# Patient Record
Sex: FEMALE | Race: WHITE | Employment: PART TIME | URBAN - METROPOLITAN AREA
[De-identification: names, ages, dates, MRNs, and addresses within clinical notes are randomized per-mention and may not be internally consistent; named-entity substitution may affect disease eponyms.]

---

## 2018-05-29 ENCOUNTER — LAB ENCOUNTER (OUTPATIENT)
Dept: LAB | Facility: HOSPITAL | Age: 58
End: 2018-05-29
Attending: INTERNAL MEDICINE
Payer: COMMERCIAL

## 2018-05-29 ENCOUNTER — OFFICE VISIT (OUTPATIENT)
Dept: INTERNAL MEDICINE CLINIC | Facility: CLINIC | Age: 58
End: 2018-05-29

## 2018-05-29 VITALS
TEMPERATURE: 98 F | HEART RATE: 85 BPM | SYSTOLIC BLOOD PRESSURE: 170 MMHG | BODY MASS INDEX: 19.9 KG/M2 | DIASTOLIC BLOOD PRESSURE: 90 MMHG | WEIGHT: 123.81 LBS | HEIGHT: 66 IN

## 2018-05-29 DIAGNOSIS — L65.9 HAIR LOSS: ICD-10-CM

## 2018-05-29 DIAGNOSIS — Z12.11 SCREENING FOR COLON CANCER: ICD-10-CM

## 2018-05-29 DIAGNOSIS — Z12.4 SCREENING FOR CERVICAL CANCER: ICD-10-CM

## 2018-05-29 DIAGNOSIS — R20.0 NUMBNESS OR TINGLING: ICD-10-CM

## 2018-05-29 DIAGNOSIS — Z00.00 ANNUAL PHYSICAL EXAM: Primary | ICD-10-CM

## 2018-05-29 DIAGNOSIS — I10 ESSENTIAL HYPERTENSION: ICD-10-CM

## 2018-05-29 DIAGNOSIS — Z00.00 ANNUAL PHYSICAL EXAM: ICD-10-CM

## 2018-05-29 DIAGNOSIS — Z12.39 SCREENING FOR BREAST CANCER: ICD-10-CM

## 2018-05-29 DIAGNOSIS — R20.2 NUMBNESS OR TINGLING: ICD-10-CM

## 2018-05-29 PROBLEM — Z80.3 FAMILY HISTORY OF BREAST CANCER IN SISTER: Status: ACTIVE | Noted: 2018-05-29

## 2018-05-29 PROBLEM — Z86.39 HISTORY OF GRAVES' DISEASE: Status: ACTIVE | Noted: 2018-05-29

## 2018-05-29 PROCEDURE — 84443 ASSAY THYROID STIM HORMONE: CPT

## 2018-05-29 PROCEDURE — 36415 COLL VENOUS BLD VENIPUNCTURE: CPT

## 2018-05-29 PROCEDURE — 80061 LIPID PANEL: CPT

## 2018-05-29 PROCEDURE — 85025 COMPLETE CBC W/AUTO DIFF WBC: CPT

## 2018-05-29 PROCEDURE — 99386 PREV VISIT NEW AGE 40-64: CPT | Performed by: INTERNAL MEDICINE

## 2018-05-29 PROCEDURE — 82607 VITAMIN B-12: CPT

## 2018-05-29 PROCEDURE — 81001 URINALYSIS AUTO W/SCOPE: CPT

## 2018-05-29 PROCEDURE — 80053 COMPREHEN METABOLIC PANEL: CPT

## 2018-05-29 PROCEDURE — 82746 ASSAY OF FOLIC ACID SERUM: CPT

## 2018-05-29 PROCEDURE — 83036 HEMOGLOBIN GLYCOSYLATED A1C: CPT

## 2018-05-29 RX ORDER — LOSARTAN POTASSIUM 50 MG/1
50 TABLET ORAL DAILY
Qty: 30 TABLET | Refills: 2 | Status: SHIPPED | OUTPATIENT
Start: 2018-05-29

## 2018-05-29 NOTE — H&P
Kalyani Younger is a 59year old female.   Patient presents with:  Blood Pressure: patient states she went in for a dentist appt and they told her that her bp was high  Hair/Scalp Problem: hair loss      HPI:     HPI  Patient is a 25-year-old female here a vision. Early cataracts b/l   Respiratory: Negative for cough, sputum production and shortness of breath. Cardiovascular: Negative for chest pain and leg swelling.    Gastrointestinal: Negative for blood in stool, constipation, diarrhea, heartburn normal without masses or tenderness  Perineum: normal  Anus: no hemorrhoids,   Bimanual pelvic exam and PAP smear obtained      ASSESSMENT AND PLAN:       Diagnoses and all orders for this visit:    Screening for colon cancer  -     GASTRO - INTERNAL    Sc daily.            The patient indicates understanding of these issues and agrees to the plan.               Duncan Garcia MD  5/29/2018  1:36 PM

## 2018-05-29 NOTE — ASSESSMENT & PLAN NOTE
Starting losartan 50 mg. Advised low salt diet. Eat less of canned , frozen, dried, packaged and fast food. Limit caffeine, alcohol. No smoking. Exercise regularly, at least 20 minutes 3 times a week. Maintain healthy body weight.    Check BP regularly

## 2018-05-30 PROBLEM — E53.8 VITAMIN B12 DEFICIENCY: Status: ACTIVE | Noted: 2018-05-30

## 2018-07-12 ENCOUNTER — HOSPITAL ENCOUNTER (OUTPATIENT)
Dept: MAMMOGRAPHY | Age: 58
Discharge: HOME | End: 2018-07-12
Attending: INTERNAL MEDICINE
Payer: COMMERCIAL

## 2018-07-12 DIAGNOSIS — Z12.39 SCREENING FOR BREAST CANCER: ICD-10-CM

## 2018-07-12 PROCEDURE — 77067 SCR MAMMO BI INCL CAD: CPT | Performed by: INTERNAL MEDICINE

## 2018-07-26 ENCOUNTER — TELEPHONE (OUTPATIENT)
Dept: OTHER | Age: 58
End: 2018-07-26

## 2018-07-26 NOTE — TELEPHONE ENCOUNTER
Pt calling back in regards to message about Mammogram.  Informed pt of Dr Evelina Isbell message below and pt verb understanding.                   Notes recorded by Berta Dougherty MD on 7/25/2018 at 7:38 AM CDT  Please inform the patient that report after comparis